# Patient Record
Sex: MALE | Race: ASIAN | Employment: PART TIME
[De-identification: names, ages, dates, MRNs, and addresses within clinical notes are randomized per-mention and may not be internally consistent; named-entity substitution may affect disease eponyms.]

---
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
## 2024-02-26 PROBLEM — J45.909 ASTHMA: Status: ACTIVE | Noted: 2024-02-26

## 2024-02-27 NOTE — PROGRESS NOTES
Chief Complaint   Patient presents with    Depression      Taking zoloft feels like it wears off at about six pm and feels very tired after this .    Anxiety     Assessment & Plan:     1. ALEK (generalized anxiety disorder)  Assessment & Plan:  Worsening  Increase sertraline to 50 mg daily  Re-evaluate in 4 weeks  If no improvement, consider switching to prozac  Orders:  -     sertraline (ZOLOFT) 50 MG tablet; Take 1 tablet by mouth daily, Disp-90 tablet, R-1Normal  2. Moderate episode of recurrent major depressive disorder (HCC)  Assessment & Plan:  Improving  Increase sertraline to 50 mg daily d/t worsening depression  Orders:  -     sertraline (ZOLOFT) 50 MG tablet; Take 1 tablet by mouth daily, Disp-90 tablet, R-1Normal    Follow-up and Dispositions    Return in about 4 weeks (around 4/2/2024) for Anxiety, Depression, VIRTUAL VISIT, Lab results.       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  States one of his workers did not show up, and had to do 3 different jobs  States he still does not have a car, coworkers give him rides, and does not ask his dad for rides  Is thinking about working for [employer]      3/5/2024     3:54 PM 12/22/2023     3:50 PM 10/26/2023     2:20 PM   ALEK-7 SCREENING   Feeling nervous, anxious, or on edge Nearly every day More than half the days Several days   Not being able to stop or control worrying More than half the days Nearly every day Nearly every day   Worrying too much about different things Nearly every day Nearly every day Nearly every day   Trouble relaxing Nearly every day More than half the days More than half the days   Being so restless that it is hard to sit still More than half the days More than half the days Several days   Becoming easily

## 2024-02-27 NOTE — ASSESSMENT & PLAN NOTE
Worsening  Increase sertraline to 50 mg daily  Re-evaluate in 4 weeks  If no improvement, consider switching to prozac

## 2024-03-05 ENCOUNTER — OFFICE VISIT (OUTPATIENT)
Facility: CLINIC | Age: 19
End: 2024-03-05
Payer: OTHER GOVERNMENT

## 2024-03-05 VITALS
DIASTOLIC BLOOD PRESSURE: 62 MMHG | BODY MASS INDEX: 18.17 KG/M2 | RESPIRATION RATE: 16 BRPM | SYSTOLIC BLOOD PRESSURE: 102 MMHG | TEMPERATURE: 98.3 F | OXYGEN SATURATION: 99 % | WEIGHT: 126.6 LBS | HEART RATE: 58 BPM

## 2024-03-05 DIAGNOSIS — F41.1 GAD (GENERALIZED ANXIETY DISORDER): Primary | ICD-10-CM

## 2024-03-05 DIAGNOSIS — F33.1 MODERATE EPISODE OF RECURRENT MAJOR DEPRESSIVE DISORDER (HCC): ICD-10-CM

## 2024-03-05 PROCEDURE — 99214 OFFICE O/P EST MOD 30 MIN: CPT

## 2024-03-05 ASSESSMENT — ANXIETY QUESTIONNAIRES
1. FEELING NERVOUS, ANXIOUS, OR ON EDGE: 3
3. WORRYING TOO MUCH ABOUT DIFFERENT THINGS: 3
6. BECOMING EASILY ANNOYED OR IRRITABLE: 2
4. TROUBLE RELAXING: 3
GAD7 TOTAL SCORE: 16
5. BEING SO RESTLESS THAT IT IS HARD TO SIT STILL: 2
2. NOT BEING ABLE TO STOP OR CONTROL WORRYING: 2
7. FEELING AFRAID AS IF SOMETHING AWFUL MIGHT HAPPEN: 1
IF YOU CHECKED OFF ANY PROBLEMS ON THIS QUESTIONNAIRE, HOW DIFFICULT HAVE THESE PROBLEMS MADE IT FOR YOU TO DO YOUR WORK, TAKE CARE OF THINGS AT HOME, OR GET ALONG WITH OTHER PEOPLE: SOMEWHAT DIFFICULT

## 2024-03-05 ASSESSMENT — COLUMBIA-SUICIDE SEVERITY RATING SCALE - C-SSRS
2. HAVE YOU ACTUALLY HAD ANY THOUGHTS OF KILLING YOURSELF?: NO
1. WITHIN THE PAST MONTH, HAVE YOU WISHED YOU WERE DEAD OR WISHED YOU COULD GO TO SLEEP AND NOT WAKE UP?: YES
6. HAVE YOU EVER DONE ANYTHING, STARTED TO DO ANYTHING, OR PREPARED TO DO ANYTHING TO END YOUR LIFE?: NO

## 2024-03-05 ASSESSMENT — PATIENT HEALTH QUESTIONNAIRE - PHQ9
9. THOUGHTS THAT YOU WOULD BE BETTER OFF DEAD, OR OF HURTING YOURSELF: 1
3. TROUBLE FALLING OR STAYING ASLEEP: 2
5. POOR APPETITE OR OVEREATING: 2
SUM OF ALL RESPONSES TO PHQ QUESTIONS 1-9: 11
SUM OF ALL RESPONSES TO PHQ QUESTIONS 1-9: 10
2. FEELING DOWN, DEPRESSED OR HOPELESS: 1
SUM OF ALL RESPONSES TO PHQ QUESTIONS 1-9: 11
10. IF YOU CHECKED OFF ANY PROBLEMS, HOW DIFFICULT HAVE THESE PROBLEMS MADE IT FOR YOU TO DO YOUR WORK, TAKE CARE OF THINGS AT HOME, OR GET ALONG WITH OTHER PEOPLE: 1
SUM OF ALL RESPONSES TO PHQ QUESTIONS 1-9: 11
7. TROUBLE CONCENTRATING ON THINGS, SUCH AS READING THE NEWSPAPER OR WATCHING TELEVISION: 1
8. MOVING OR SPEAKING SO SLOWLY THAT OTHER PEOPLE COULD HAVE NOTICED. OR THE OPPOSITE, BEING SO FIGETY OR RESTLESS THAT YOU HAVE BEEN MOVING AROUND A LOT MORE THAN USUAL: 2
4. FEELING TIRED OR HAVING LITTLE ENERGY: 1
6. FEELING BAD ABOUT YOURSELF - OR THAT YOU ARE A FAILURE OR HAVE LET YOURSELF OR YOUR FAMILY DOWN: 1

## 2024-03-05 NOTE — PROGRESS NOTES
Chantal Tavarez presents today for   Chief Complaint   Patient presents with    Depression      Taking zoloft feels like it wears off at about six pm and feels very tired after this .    Anxiety       Is someone accompanying this pt? no    Is the patient using any DME equipment during OV? no    Depression Screening:      3/5/2024     3:55 PM 12/22/2023     3:48 PM 10/26/2023     2:19 PM   PHQ-9 Questionaire   Little interest or pleasure in doing things  1 2   Feeling down, depressed, or hopeless 1 1 2   Trouble falling or staying asleep, or sleeping too much 2 3 3   Feeling tired or having little energy 1 1 2   Poor appetite or overeating 2 2 1   Feeling bad about yourself - or that you are a failure or have let yourself or your family down 1 1 1   Trouble concentrating on things, such as reading the newspaper or watching television 1 2 1   Moving or speaking so slowly that other people could have noticed. Or the opposite - being so fidgety or restless that you have been moving around a lot more than usual 2 2 2   Thoughts that you would be better off dead, or of hurting yourself in some way 1 1 1   PHQ-9 Total Score 11 14 15   If you checked off any problems, how difficult have these problems made it for you to do your work, take care of things at home, or get along with other people? 1 1 1        ALEK 7-Anxiety       3/5/2024     3:54 PM 12/22/2023     3:50 PM 10/26/2023     2:20 PM   ALEK-7 SCREENING   Feeling nervous, anxious, or on edge Nearly every day More than half the days Several days   Not being able to stop or control worrying More than half the days Nearly every day Nearly every day   Worrying too much about different things Nearly every day Nearly every day Nearly every day   Trouble relaxing Nearly every day More than half the days More than half the days   Being so restless that it is hard to sit still More than half the days More than half the days Several days   Becoming easily annoyed or irritable

## 2024-04-11 ENCOUNTER — TELEMEDICINE (OUTPATIENT)
Facility: CLINIC | Age: 19
End: 2024-04-11
Payer: OTHER GOVERNMENT

## 2024-04-11 DIAGNOSIS — F33.1 MODERATE EPISODE OF RECURRENT MAJOR DEPRESSIVE DISORDER (HCC): ICD-10-CM

## 2024-04-11 DIAGNOSIS — F41.1 GAD (GENERALIZED ANXIETY DISORDER): ICD-10-CM

## 2024-04-11 DIAGNOSIS — R05.1 ACUTE COUGH: Primary | ICD-10-CM

## 2024-04-11 PROCEDURE — 99214 OFFICE O/P EST MOD 30 MIN: CPT

## 2024-04-11 RX ORDER — SERTRALINE HYDROCHLORIDE 25 MG/1
25 TABLET, FILM COATED ORAL DAILY
COMMUNITY
Start: 2024-03-28 | End: 2024-04-11

## 2024-04-11 RX ORDER — FLUTICASONE PROPIONATE 50 MCG
1 SPRAY, SUSPENSION (ML) NASAL DAILY
Qty: 32 G | Refills: 1 | Status: SHIPPED | OUTPATIENT
Start: 2024-04-11

## 2024-04-11 RX ORDER — CETIRIZINE HYDROCHLORIDE 10 MG/1
10 TABLET ORAL DAILY
Qty: 90 TABLET | Refills: 1 | Status: SHIPPED | OUTPATIENT
Start: 2024-04-11

## 2024-04-11 SDOH — ECONOMIC STABILITY: FOOD INSECURITY: WITHIN THE PAST 12 MONTHS, YOU WORRIED THAT YOUR FOOD WOULD RUN OUT BEFORE YOU GOT MONEY TO BUY MORE.: NEVER TRUE

## 2024-04-11 SDOH — ECONOMIC STABILITY: FOOD INSECURITY: WITHIN THE PAST 12 MONTHS, THE FOOD YOU BOUGHT JUST DIDN'T LAST AND YOU DIDN'T HAVE MONEY TO GET MORE.: NEVER TRUE

## 2024-04-11 SDOH — ECONOMIC STABILITY: INCOME INSECURITY: HOW HARD IS IT FOR YOU TO PAY FOR THE VERY BASICS LIKE FOOD, HOUSING, MEDICAL CARE, AND HEATING?: NOT HARD AT ALL

## 2024-04-11 ASSESSMENT — PATIENT HEALTH QUESTIONNAIRE - PHQ9
SUM OF ALL RESPONSES TO PHQ QUESTIONS 1-9: 14
9. THOUGHTS THAT YOU WOULD BE BETTER OFF DEAD, OR OF HURTING YOURSELF: NOT AT ALL
SUM OF ALL RESPONSES TO PHQ QUESTIONS 1-9: 14
10. IF YOU CHECKED OFF ANY PROBLEMS, HOW DIFFICULT HAVE THESE PROBLEMS MADE IT FOR YOU TO DO YOUR WORK, TAKE CARE OF THINGS AT HOME, OR GET ALONG WITH OTHER PEOPLE: VERY DIFFICULT
3. TROUBLE FALLING OR STAYING ASLEEP: NEARLY EVERY DAY
1. LITTLE INTEREST OR PLEASURE IN DOING THINGS: NEARLY EVERY DAY
SUM OF ALL RESPONSES TO PHQ QUESTIONS 1-9: 14
5. POOR APPETITE OR OVEREATING: SEVERAL DAYS
SUM OF ALL RESPONSES TO PHQ9 QUESTIONS 1 & 2: 4
6. FEELING BAD ABOUT YOURSELF - OR THAT YOU ARE A FAILURE OR HAVE LET YOURSELF OR YOUR FAMILY DOWN: SEVERAL DAYS
8. MOVING OR SPEAKING SO SLOWLY THAT OTHER PEOPLE COULD HAVE NOTICED. OR THE OPPOSITE, BEING SO FIGETY OR RESTLESS THAT YOU HAVE BEEN MOVING AROUND A LOT MORE THAN USUAL: SEVERAL DAYS
2. FEELING DOWN, DEPRESSED OR HOPELESS: SEVERAL DAYS
7. TROUBLE CONCENTRATING ON THINGS, SUCH AS READING THE NEWSPAPER OR WATCHING TELEVISION: SEVERAL DAYS
4. FEELING TIRED OR HAVING LITTLE ENERGY: NEARLY EVERY DAY
SUM OF ALL RESPONSES TO PHQ QUESTIONS 1-9: 14

## 2024-04-11 ASSESSMENT — ENCOUNTER SYMPTOMS
RHINORRHEA: 1
SINUS PAIN: 1
SHORTNESS OF BREATH: 0
COUGH: 1

## 2024-04-11 ASSESSMENT — ANXIETY QUESTIONNAIRES
6. BECOMING EASILY ANNOYED OR IRRITABLE: MORE THAN HALF THE DAYS
IF YOU CHECKED OFF ANY PROBLEMS ON THIS QUESTIONNAIRE, HOW DIFFICULT HAVE THESE PROBLEMS MADE IT FOR YOU TO DO YOUR WORK, TAKE CARE OF THINGS AT HOME, OR GET ALONG WITH OTHER PEOPLE: SOMEWHAT DIFFICULT
3. WORRYING TOO MUCH ABOUT DIFFERENT THINGS: SEVERAL DAYS
GAD7 TOTAL SCORE: 9
2. NOT BEING ABLE TO STOP OR CONTROL WORRYING: SEVERAL DAYS
1. FEELING NERVOUS, ANXIOUS, OR ON EDGE: MORE THAN HALF THE DAYS
5. BEING SO RESTLESS THAT IT IS HARD TO SIT STILL: NOT AT ALL
4. TROUBLE RELAXING: MORE THAN HALF THE DAYS
7. FEELING AFRAID AS IF SOMETHING AWFUL MIGHT HAPPEN: SEVERAL DAYS

## 2024-04-11 NOTE — PROGRESS NOTES
Chantal Tavarez is a 18 y.o. male who was seen by synchronous (real-time) audio-video technology on 4/11/2024 for Anxiety, Depression, and Cough (Pt states cough/cold is improving. )    Assessment & Plan:     1. Acute cough  Comments:  Improving, likely viral  Start daily flonase and cetirizine   If no improvement, start augmentin  Orders:  -     fluticasone (FLONASE) 50 MCG/ACT nasal spray; 1 spray by Each Nostril route daily, Disp-32 g, R-1Normal  -     cetirizine (ZYRTEC) 10 MG tablet; Take 1 tablet by mouth daily, Disp-90 tablet, R-1Normal  2. ALEK (generalized anxiety disorder)  Assessment & Plan:  Improving  Continue sertraline 50 mg daily  Did not receive 50 mg sertraline tablets  Reordered sertraline 50 mg tablets  Advised pt to take two 25 mg tablets daily (at one time), until he receives the 50 mg tablets  Re-evaluate in 4 weeks  Orders:  -     sertraline (ZOLOFT) 50 MG tablet; Take 1 tablet by mouth daily, Disp-90 tablet, R-1Normal  3. Moderate episode of recurrent major depressive disorder (HCC)  Assessment & Plan:  Worsening, possibly d/t compliance, had been taking 25 mg tablets BID  Continue sertraline 50 mg daily  Did not receive 50 mg sertraline tablets  Reordered sertraline 50 mg tablets  Advised pt to take two 25 mg tablets daily (at one time), until he receives the 50 mg tablets  Re-evaluate in 4 weeks  If no improvement or symptoms persist, consider switching to prozac  Orders:  -     sertraline (ZOLOFT) 50 MG tablet; Take 1 tablet by mouth daily, Disp-90 tablet, R-1Normal       SUBJECTIVE:     HPI    Acute cough  3 days ago   Associated symptoms: Rhinorrhea, Ear pain left, Sneezing, Sinus pain, and Cough productive (green mucus)  Denies fevers or chills  Pertinent Negatives:   no other symptoms  Sick contacts:   Yes, coworker had covid  Recent COVID test:   No  Recent flu test:   No  Treatment:  cough drops    Anxiety  Patient is seen for anxiety  Associated symptoms:  see ALEK-7  Current

## 2024-04-11 NOTE — ASSESSMENT & PLAN NOTE
Worsening, possibly d/t compliance, had been taking 25 mg tablets BID  Continue sertraline 50 mg daily  Did not receive 50 mg sertraline tablets  Reordered sertraline 50 mg tablets  Advised pt to take two 25 mg tablets daily (at one time), until he receives the 50 mg tablets  Re-evaluate in 4 weeks  If no improvement or symptoms persist, consider switching to prozac

## 2024-04-11 NOTE — ASSESSMENT & PLAN NOTE
Improving  Continue sertraline 50 mg daily  Did not receive 50 mg sertraline tablets  Reordered sertraline 50 mg tablets  Advised pt to take two 25 mg tablets daily (at one time), until he receives the 50 mg tablets  Re-evaluate in 4 weeks

## 2024-04-11 NOTE — PROGRESS NOTES
Chantal Tavarez presents today for   Chief Complaint   Patient presents with    Anxiety    Depression    Cough     Pt states cough/cold is improving.      Is the patient in the [facility] ? no    Is someone accompanying this pt? no    Is the patient using any DME equipment during OV? no    Depression Screenin/11/2024    10:25 AM 3/5/2024     3:55 PM 2023     3:48 PM 10/26/2023     2:19 PM   PHQ-9 Questionaire   Little interest or pleasure in doing things 3  1 2   Feeling down, depressed, or hopeless 1 1 1 2   Trouble falling or staying asleep, or sleeping too much 3 2 3 3   Feeling tired or having little energy 3 1 1 2   Poor appetite or overeating 1 2 2 1   Feeling bad about yourself - or that you are a failure or have let yourself or your family down 1 1 1 1   Trouble concentrating on things, such as reading the newspaper or watching television 1 1 2 1   Moving or speaking so slowly that other people could have noticed. Or the opposite - being so fidgety or restless that you have been moving around a lot more than usual 1 2 2 2   Thoughts that you would be better off dead, or of hurting yourself in some way 0 1 1 1   PHQ-9 Total Score 14 11 14 15   If you checked off any problems, how difficult have these problems made it for you to do your work, take care of things at home, or get along with other people? 2 1 1 1        ALEK 7-Anxiety       2024    10:27 AM 3/5/2024     3:54 PM 2023     3:50 PM 10/26/2023     2:20 PM   ALEK-7 SCREENING   Feeling nervous, anxious, or on edge More than half the days Nearly every day More than half the days Several days   Not being able to stop or control worrying Several days More than half the days Nearly every day Nearly every day   Worrying too much about different things Several days Nearly every day Nearly every day Nearly every day   Trouble relaxing More than half the days Nearly every day More than half the days More than half the days   Being

## 2024-10-15 DIAGNOSIS — F33.1 MODERATE EPISODE OF RECURRENT MAJOR DEPRESSIVE DISORDER (HCC): ICD-10-CM

## 2024-10-15 DIAGNOSIS — F41.1 GAD (GENERALIZED ANXIETY DISORDER): ICD-10-CM

## 2024-10-15 NOTE — TELEPHONE ENCOUNTER
Patient requesting refill to be sent to [facility] [place]      sertraline (ZOLOFT) 50 MG tablet

## 2024-10-15 NOTE — TELEPHONE ENCOUNTER
Labs: No results found for: \"NA\", \"K\", \"CL\", \"CO2\", \"BUN\", \"CREATININE\", \"GLUCOSE\", \"CALCIUM\", \"LABALBU\", \"BILITOT\", \"ALKPHOS\", \"AST\", \"ALT\", \"LABGLOM\", \"GFRAA\", \"AGRATIO\", \"GLOB\"     Additional Notes:

## 2024-11-07 ENCOUNTER — TELEPHONE (OUTPATIENT)
Facility: CLINIC | Age: 19
End: 2024-11-07

## 2024-11-07 DIAGNOSIS — F41.1 GAD (GENERALIZED ANXIETY DISORDER): Primary | ICD-10-CM

## 2024-11-07 DIAGNOSIS — F33.1 MODERATE EPISODE OF RECURRENT MAJOR DEPRESSIVE DISORDER (HCC): ICD-10-CM

## 2024-11-07 DIAGNOSIS — Z13.220 SCREENING FOR LIPID DISORDERS: ICD-10-CM

## 2024-11-07 DIAGNOSIS — Z11.4 SCREENING FOR HIV (HUMAN IMMUNODEFICIENCY VIRUS): ICD-10-CM

## 2024-11-07 DIAGNOSIS — Z11.59 NEED FOR HEPATITIS C SCREENING TEST: ICD-10-CM

## 2024-11-07 NOTE — TELEPHONE ENCOUNTER
Please advise pt to complete labs prior to next appointment. I will not refill his sertraline until he completes labs. New labs ordered.   Thank you!
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
## 2024-11-10 PROBLEM — Z91.199 NONCOMPLIANCE WITH TREATMENT REGIMEN: Status: ACTIVE | Noted: 2024-11-10

## 2024-11-15 DIAGNOSIS — F33.1 MODERATE EPISODE OF RECURRENT MAJOR DEPRESSIVE DISORDER (HCC): ICD-10-CM

## 2024-11-15 DIAGNOSIS — F41.1 GAD (GENERALIZED ANXIETY DISORDER): ICD-10-CM
